# Patient Record
Sex: FEMALE | Race: WHITE | URBAN - METROPOLITAN AREA
[De-identification: names, ages, dates, MRNs, and addresses within clinical notes are randomized per-mention and may not be internally consistent; named-entity substitution may affect disease eponyms.]

---

## 2021-10-28 ENCOUNTER — OFFICE VISIT (OUTPATIENT)
Dept: URBAN - METROPOLITAN AREA CLINIC 1 | Facility: CLINIC | Age: 76
End: 2021-10-28
Payer: COMMERCIAL

## 2021-10-28 DIAGNOSIS — H25.12 AGE-RELATED NUCLEAR CATARACT, LEFT EYE: ICD-10-CM

## 2021-10-28 DIAGNOSIS — H01.001 BLEPHARITIS OF RIGHT UPPER EYELID: Primary | ICD-10-CM

## 2021-10-28 DIAGNOSIS — H25.811 COMBINED FORMS OF AGE-RELATED CATARACT, RIGHT EYE: ICD-10-CM

## 2021-10-28 DIAGNOSIS — H01.004 BLEPHARITIS OF LEFT UPPER EYELID: ICD-10-CM

## 2021-10-28 DIAGNOSIS — H52.03 HYPERMETROPIA, BILATERAL: ICD-10-CM

## 2021-10-28 PROCEDURE — 99214 OFFICE O/P EST MOD 30 MIN: CPT | Performed by: OPHTHALMOLOGY

## 2021-10-28 PROCEDURE — 92015 DETERMINE REFRACTIVE STATE: CPT | Performed by: OPHTHALMOLOGY

## 2021-10-28 RX ORDER — TOBRAMYCIN AND DEXAMETHASONE 3; 1 MG/G; MG/G
OINTMENT OPHTHALMIC
Qty: 3.5 | Refills: 1 | Status: ACTIVE
Start: 2021-10-28

## 2021-10-28 ASSESSMENT — VISUAL ACUITY
OD: 20/30
OS: 20/30

## 2021-10-28 ASSESSMENT — INTRAOCULAR PRESSURE
OS: 21
OD: 13

## 2021-10-28 NOTE — IMPRESSION/PLAN
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 Plan: We explained to patient the outcome of bleph in lids. We are recommending Tea Tree Oil BID OU. The need of a night ointment both eyes for better lubrication for 2 weeks (tobradex) which patient is already familiar with the ointment. The need of daily artificial tears OU TID. Patient understood. f/u 1 month.

## 2021-10-28 NOTE — IMPRESSION/PLAN
Impression: Blepharitis of right upper eyelid: H01.001. Plan: We explained to patient the outcome of bleph in lids. We are recommending Tea Tree Oil BID OU. The need of a night ointment both eyes for better lubrication for 2 weeks (tobradex) which patient is already familiar with the ointment. The need of daily artificial tears OU TID. Patient understood. Patient wanted a print rx which was given today in office. f/u 1 month.

## 2022-01-31 ENCOUNTER — OFFICE VISIT (OUTPATIENT)
Dept: URBAN - METROPOLITAN AREA CLINIC 1 | Facility: CLINIC | Age: 77
End: 2022-01-31
Payer: COMMERCIAL

## 2022-01-31 PROCEDURE — 99213 OFFICE O/P EST LOW 20 MIN: CPT | Performed by: OPHTHALMOLOGY

## 2022-01-31 RX ORDER — ERYTHROMYCIN 5 MG/G
OINTMENT OPHTHALMIC
Qty: 3.5 | Refills: 1 | Status: ACTIVE
Start: 2022-01-31

## 2022-01-31 ASSESSMENT — INTRAOCULAR PRESSURE
OS: 13
OD: 14

## 2022-01-31 NOTE — IMPRESSION/PLAN
Impression: Age-related nuclear cataract, left eye: H25.12. Plan: Observe for now without intervention. The patient was advised to contact us if any change or worsening of vision.

## 2022-01-31 NOTE — IMPRESSION/PLAN
Impression: Blepharitis of right upper eyelid: H01.001. Plan: Blepharitis/Meibomian Gland Dysfunction bilateral upper and lower lidBleps - Glands expressed in office. Start lid hygiene and warm compresses daily. Discussed with the patient benefits of flaxseed oil or omega 3 supplements. Instructions given. pt to start using AT's QID, erythromycin QHS OU, and TTO scrubs BID OU.

## 2022-01-31 NOTE — IMPRESSION/PLAN
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 Plan: Blepharitis/Meibomian Gland Dysfunction bilateral upper and lower lidBleps - Glands expressed in office. Start lid hygiene and warm compresses daily. Discussed with the patient benefits of flaxseed oil or omega 3 supplements. Instructions given.

## 2022-05-02 ENCOUNTER — OFFICE VISIT (OUTPATIENT)
Dept: URBAN - METROPOLITAN AREA CLINIC 1 | Facility: CLINIC | Age: 77
End: 2022-05-02
Payer: COMMERCIAL

## 2022-05-02 DIAGNOSIS — H25.12 AGE-RELATED NUCLEAR CATARACT, LEFT EYE: ICD-10-CM

## 2022-05-02 DIAGNOSIS — H25.811 COMBINED FORMS OF AGE-RELATED CATARACT, RIGHT EYE: ICD-10-CM

## 2022-05-02 DIAGNOSIS — H01.001 BLEPHARITIS OF RIGHT UPPER EYELID: Primary | ICD-10-CM

## 2022-05-02 DIAGNOSIS — H01.004 BLEPHARITIS OF LEFT UPPER EYELID: ICD-10-CM

## 2022-05-02 PROCEDURE — 99213 OFFICE O/P EST LOW 20 MIN: CPT | Performed by: OPHTHALMOLOGY

## 2022-05-02 RX ORDER — NEOMYCIN SULFATE, POLYMYXIN B SULFATE AND DEXAMETHASONE 3.5; 10000; 1 MG/ML; [USP'U]/ML; MG/ML
SUSPENSION/ DROPS OPHTHALMIC
Qty: 10 | Refills: 0 | Status: ACTIVE
Start: 2022-05-02

## 2022-05-02 ASSESSMENT — INTRAOCULAR PRESSURE
OS: 14
OD: 14

## 2022-05-02 NOTE — IMPRESSION/PLAN
Impression: Blepharitis of right upper eyelid: H01.001. Plan: Blepharitis/Meibomian Gland Dysfunction bilateral upper and lower lidBleps due to demodex infestation. Start: 1. Artificial tears QID OU 2. Occusoft Tea Tree Oil (allergy) lid wipes 3. ISAAC/POLY/DEX  Taper 4-3-2-1 x 1 month Instructions given. 

F/U in 1 month

## 2022-08-10 ENCOUNTER — OFFICE VISIT (OUTPATIENT)
Dept: URBAN - METROPOLITAN AREA CLINIC 1 | Facility: CLINIC | Age: 77
End: 2022-08-10
Payer: COMMERCIAL

## 2022-08-10 DIAGNOSIS — H53.2 DIPLOPIA: ICD-10-CM

## 2022-08-10 DIAGNOSIS — H52.03 HYPERMETROPIA, BILATERAL: Primary | ICD-10-CM

## 2022-08-10 DIAGNOSIS — H25.811 COMBINED FORMS OF AGE-RELATED CATARACT, RIGHT EYE: ICD-10-CM

## 2022-08-10 DIAGNOSIS — H25.12 AGE-RELATED NUCLEAR CATARACT, LEFT EYE: ICD-10-CM

## 2022-08-10 PROCEDURE — 99213 OFFICE O/P EST LOW 20 MIN: CPT | Performed by: OPHTHALMOLOGY

## 2022-08-10 ASSESSMENT — INTRAOCULAR PRESSURE
OS: 13
OD: 13

## 2022-08-10 NOTE — IMPRESSION/PLAN
Impression: Diplopia: H53.2. Plan:  I have evaluated Ms. 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 Dr. Dario Solano, her PCP, has ordered an MRI and carotid survey, but we have suggested a myasthenia gravis workup as well. I will follow her in 3-4 weeks to re-evaluate.   No headache, pupil-involvement, jaw claudication, or visual acuity changes present on today's exam.

## 2022-08-31 ENCOUNTER — OFFICE VISIT (OUTPATIENT)
Dept: URBAN - METROPOLITAN AREA CLINIC 1 | Facility: CLINIC | Age: 77
End: 2022-08-31
Payer: COMMERCIAL

## 2022-08-31 DIAGNOSIS — H53.2 DIPLOPIA: ICD-10-CM

## 2022-08-31 DIAGNOSIS — H25.811 COMBINED FORMS OF AGE-RELATED CATARACT, RIGHT EYE: ICD-10-CM

## 2022-08-31 DIAGNOSIS — H25.12 AGE-RELATED NUCLEAR CATARACT, LEFT EYE: Primary | ICD-10-CM

## 2022-08-31 PROCEDURE — 99212 OFFICE O/P EST SF 10 MIN: CPT | Performed by: OPHTHALMOLOGY

## 2022-08-31 ASSESSMENT — INTRAOCULAR PRESSURE
OD: 15
OS: 16

## 2022-08-31 NOTE — IMPRESSION/PLAN
Impression: Diplopia: H53.2. Plan: Dr. Anabella Braxton, her PCP, has ordered an MRI and carotid survey, waiting for results. Eye exam stable in today's visit. 

6 months

## 2022-11-29 ENCOUNTER — APPOINTMENT (RX ONLY)
Dept: URBAN - METROPOLITAN AREA CLINIC 129 | Facility: CLINIC | Age: 77
Setting detail: DERMATOLOGY
End: 2022-11-29

## 2022-11-29 DIAGNOSIS — L85.3 XEROSIS CUTIS: ICD-10-CM

## 2022-11-29 DIAGNOSIS — L82.0 INFLAMED SEBORRHEIC KERATOSIS: ICD-10-CM

## 2022-11-29 PROCEDURE — 17110 DESTRUCTION B9 LES UP TO 14: CPT

## 2022-11-29 PROCEDURE — ? LIQUID NITROGEN

## 2022-11-29 PROCEDURE — ? COUNSELING

## 2022-11-29 PROCEDURE — 99202 OFFICE O/P NEW SF 15 MIN: CPT | Mod: 25

## 2022-11-29 ASSESSMENT — LOCATION DETAILED DESCRIPTION DERM
LOCATION DETAILED: RIGHT CENTRAL ZYGOMA
LOCATION DETAILED: RIGHT PROXIMAL DORSAL FOREARM
LOCATION DETAILED: LEFT DISTAL DORSAL FOREARM
LOCATION DETAILED: LEFT ULNAR DORSAL HAND
LOCATION DETAILED: RIGHT MEDIAL TEMPLE
LOCATION DETAILED: LEFT CENTRAL TEMPLE

## 2022-11-29 ASSESSMENT — LOCATION ZONE DERM
LOCATION ZONE: HAND
LOCATION ZONE: FACE
LOCATION ZONE: ARM

## 2022-11-29 ASSESSMENT — LOCATION SIMPLE DESCRIPTION DERM
LOCATION SIMPLE: RIGHT FOREARM
LOCATION SIMPLE: LEFT TEMPLE
LOCATION SIMPLE: LEFT FOREARM
LOCATION SIMPLE: RIGHT TEMPLE
LOCATION SIMPLE: LEFT HAND
LOCATION SIMPLE: RIGHT ZYGOMA

## 2022-11-29 NOTE — PROCEDURE: LIQUID NITROGEN
Render Note In Bullet Format When Appropriate: No
Medical Necessity Information: It is in your best interest to select a reason for this procedure from the list below. All of these items fulfill various CMS LCD requirements except the new and changing color options.
Consent: The patient's consent was obtained including but not limited to risks of crusting, scabbing, blistering, scarring, darker or lighter pigmentary change, recurrence, incomplete removal and infection.
Detail Level: Detailed
Post-Care Instructions: I reviewed with the patient in detail post-care instructions. Patient is to wear sunprotection, and avoid picking at any of the treated lesions. Pt may apply Vaseline to crusted or scabbing areas.
Show Topical Anesthesia Variable?: Yes
Spray Paint Text: The liquid nitrogen was applied to the skin utilizing a spray paint frosting technique.
Medical Necessity Clause: This procedure was medically necessary because the lesions that were treated were:

## 2023-03-01 ENCOUNTER — OFFICE VISIT (OUTPATIENT)
Dept: URBAN - METROPOLITAN AREA CLINIC 1 | Facility: CLINIC | Age: 78
End: 2023-03-01
Payer: COMMERCIAL

## 2023-03-01 DIAGNOSIS — H25.12 AGE-RELATED NUCLEAR CATARACT, LEFT EYE: ICD-10-CM

## 2023-03-01 DIAGNOSIS — H04.123 TEAR FILM INSUFFICIENCY OF BILATERAL LACRIMAL GLANDS: ICD-10-CM

## 2023-03-01 DIAGNOSIS — H25.811 COMBINED FORMS OF AGE-RELATED CATARACT, RIGHT EYE: Primary | ICD-10-CM

## 2023-03-01 PROCEDURE — 99213 OFFICE O/P EST LOW 20 MIN: CPT | Performed by: OPHTHALMOLOGY

## 2023-03-01 ASSESSMENT — INTRAOCULAR PRESSURE
OD: 14
OS: 14

## 2024-03-07 ENCOUNTER — OFFICE VISIT (OUTPATIENT)
Dept: URBAN - METROPOLITAN AREA CLINIC 1 | Facility: CLINIC | Age: 79
End: 2024-03-07
Payer: COMMERCIAL

## 2024-03-07 DIAGNOSIS — H25.12 AGE-RELATED NUCLEAR CATARACT, LEFT EYE: ICD-10-CM

## 2024-03-07 DIAGNOSIS — H25.811 COMBINED FORMS OF AGE-RELATED CATARACT, RIGHT EYE: ICD-10-CM

## 2024-03-07 DIAGNOSIS — H04.123 TEAR FILM INSUFFICIENCY OF BILATERAL LACRIMAL GLANDS: ICD-10-CM

## 2024-03-07 DIAGNOSIS — H04.323: Primary | ICD-10-CM

## 2024-03-07 PROCEDURE — 99213 OFFICE O/P EST LOW 20 MIN: CPT | Performed by: OPHTHALMOLOGY

## 2024-03-07 RX ORDER — AMOXICILLIN AND CLAVULANATE POTASSIUM 500; 125 MG/1; 1/1
TABLET, FILM COATED ORAL
Qty: 14 | Refills: 0 | Status: ACTIVE
Start: 2024-03-07

## 2024-03-07 ASSESSMENT — VISUAL ACUITY
OS: 20/25
OD: 20/20

## 2024-03-07 ASSESSMENT — INTRAOCULAR PRESSURE
OS: 16
OD: 12

## 2024-03-14 ENCOUNTER — OFFICE VISIT (OUTPATIENT)
Dept: URBAN - METROPOLITAN AREA CLINIC 1 | Facility: CLINIC | Age: 79
End: 2024-03-14
Payer: COMMERCIAL

## 2024-03-14 DIAGNOSIS — H04.123 TEAR FILM INSUFFICIENCY OF BILATERAL LACRIMAL GLANDS: ICD-10-CM

## 2024-03-14 DIAGNOSIS — H04.323: Primary | ICD-10-CM

## 2024-03-14 PROCEDURE — 99212 OFFICE O/P EST SF 10 MIN: CPT | Performed by: OPHTHALMOLOGY

## 2024-03-14 ASSESSMENT — INTRAOCULAR PRESSURE
OD: 13
OS: 14

## 2024-04-15 ENCOUNTER — OFFICE VISIT (OUTPATIENT)
Dept: URBAN - METROPOLITAN AREA CLINIC 1 | Facility: CLINIC | Age: 79
End: 2024-04-15
Payer: COMMERCIAL

## 2024-04-15 DIAGNOSIS — H43.11 VITREOUS HEMORRHAGE, RIGHT EYE: Primary | ICD-10-CM

## 2024-04-15 PROCEDURE — 99213 OFFICE O/P EST LOW 20 MIN: CPT | Performed by: OPHTHALMOLOGY

## 2024-04-15 ASSESSMENT — INTRAOCULAR PRESSURE
OS: 14
OD: 15

## 2025-03-18 ENCOUNTER — OFFICE VISIT (OUTPATIENT)
Dept: URBAN - METROPOLITAN AREA CLINIC 1 | Facility: CLINIC | Age: 80
End: 2025-03-18
Payer: COMMERCIAL

## 2025-03-18 DIAGNOSIS — H43.811 VITREOUS DEGENERATION, RIGHT EYE: ICD-10-CM

## 2025-03-18 DIAGNOSIS — H04.123 TEAR FILM INSUFFICIENCY OF BILATERAL LACRIMAL GLANDS: ICD-10-CM

## 2025-03-18 DIAGNOSIS — H25.813 COMBINED FORMS OF AGE-RELATED CATARACT, BILATERAL: Primary | ICD-10-CM

## 2025-03-18 DIAGNOSIS — H53.2 DIPLOPIA: ICD-10-CM

## 2025-03-18 DIAGNOSIS — H52.4 PRESBYOPIA: ICD-10-CM

## 2025-03-18 PROCEDURE — 99213 OFFICE O/P EST LOW 20 MIN: CPT | Performed by: OPTOMETRIST

## 2025-03-18 ASSESSMENT — VISUAL ACUITY
OS: 20/20
OD: 20/25

## 2025-03-18 ASSESSMENT — INTRAOCULAR PRESSURE
OS: 18
OD: 18